# Patient Record
Sex: FEMALE | Race: WHITE | NOT HISPANIC OR LATINO | URBAN - METROPOLITAN AREA
[De-identification: names, ages, dates, MRNs, and addresses within clinical notes are randomized per-mention and may not be internally consistent; named-entity substitution may affect disease eponyms.]

---

## 2022-11-23 ENCOUNTER — OFFICE VISIT (OUTPATIENT)
Dept: URBAN - METROPOLITAN AREA CLINIC 28 | Facility: CLINIC | Age: 52
End: 2022-11-23
Payer: COMMERCIAL

## 2022-11-23 DIAGNOSIS — H11.442 CONJUNCTIVAL CYSTS, LEFT EYE: Primary | ICD-10-CM

## 2022-11-23 DIAGNOSIS — H11.012 AMYLOID PTERYGIUM OF LEFT EYE: ICD-10-CM

## 2022-11-23 PROCEDURE — 99203 OFFICE O/P NEW LOW 30 MIN: CPT | Performed by: OPTOMETRIST

## 2022-11-23 NOTE — IMPRESSION/PLAN
Impression: Amyloid pterygium of left eye: H11.012. Plan: Discussed diagnosis in detail with patient. Discussed treatment options with patient. Patient instructed to use artificial tears and sun protection. Consult recommended [Cornea Specialist] for surgical evaluation.

## 2022-11-23 NOTE — IMPRESSION/PLAN
Impression: Conjunctival cysts, left eye: H11.442. Plan: Discussed diagnosis in detail with patient. Discussed treatment options with patient. Patient instructed to use artificial tears and sun protection. Consult recommended [Cornea Specialist] for surgical evaluation.

## 2022-12-09 ENCOUNTER — OFFICE VISIT (OUTPATIENT)
Dept: URBAN - METROPOLITAN AREA CLINIC 33 | Facility: CLINIC | Age: 52
End: 2022-12-09
Payer: COMMERCIAL

## 2022-12-09 DIAGNOSIS — H11.012 AMYLOID PTERYGIUM OF LEFT EYE: Primary | ICD-10-CM

## 2022-12-09 PROCEDURE — 99203 OFFICE O/P NEW LOW 30 MIN: CPT | Performed by: OPHTHALMOLOGY

## 2022-12-09 RX ORDER — OFLOXACIN 3 MG/ML
0.3 % SOLUTION/ DROPS OPHTHALMIC
Qty: 5 | Refills: 1 | Status: ACTIVE
Start: 2022-12-09

## 2022-12-09 RX ORDER — PREDNISOLONE ACETATE 10 MG/ML
1 % SUSPENSION/ DROPS OPHTHALMIC
Qty: 10 | Refills: 1 | Status: ACTIVE
Start: 2022-12-09

## 2022-12-09 ASSESSMENT — INTRAOCULAR PRESSURE
OS: 13
OD: 13

## 2023-02-06 ENCOUNTER — POST-OPERATIVE VISIT (OUTPATIENT)
Dept: URBAN - METROPOLITAN AREA CLINIC 23 | Facility: CLINIC | Age: 53
End: 2023-02-06
Payer: COMMERCIAL

## 2023-02-06 DIAGNOSIS — Z48.810 ENCOUNTER FOR SURGICAL AFTERCARE FOLLOWING SURGERY ON A SENSE ORGAN: Primary | ICD-10-CM

## 2023-02-06 PROCEDURE — 99024 POSTOP FOLLOW-UP VISIT: CPT | Performed by: OPTOMETRIST

## 2023-02-06 ASSESSMENT — KERATOMETRY
OS: 44.75
OD: 44.75

## 2023-02-06 NOTE — IMPRESSION/PLAN
Impression: S/P Pterygium Excision with Graft OS - 17 Days. Encounter for surgical aftercare following surgery on a sense organ  Z48.810. Excellent post op course   Post operative instructions reviewed - Plan: Pt edu. Appropriate appearance and IOP. Use AT's PRN OU for comfort. Advised patient to call with any issues. RTC 3-4 weeks. Taper Pred as directed. --Continue Prednisolone acetate 1% BID OU--Advised patient to use artificial tears for comfort.

## 2023-03-03 ENCOUNTER — POST-OPERATIVE VISIT (OUTPATIENT)
Dept: URBAN - METROPOLITAN AREA CLINIC 23 | Facility: CLINIC | Age: 53
End: 2023-03-03
Payer: COMMERCIAL

## 2023-03-03 DIAGNOSIS — Z48.810 ENCOUNTER FOR SURGICAL AFTERCARE FOLLOWING SURGERY ON A SENSE ORGAN: Primary | ICD-10-CM

## 2023-03-03 PROCEDURE — 99024 POSTOP FOLLOW-UP VISIT: CPT | Performed by: OPTOMETRIST

## 2023-03-03 ASSESSMENT — INTRAOCULAR PRESSURE
OS: 13
OD: 13

## 2023-03-03 NOTE — IMPRESSION/PLAN
Impression: S/P Pterygium Excision with Graft OS - 42 Days. Encounter for surgical aftercare following surgery on a sense organ  Z48.810. Excellent post op course   Post operative instructions reviewed - Plan: Pt edu. Appropriate appearance and IOP. Use AT's PRN OU for comfort. Advised patient to call with any issues. RTC as scheduled. --Advised patient to use artificial tears for comfort.

## 2023-04-11 ENCOUNTER — OFFICE VISIT (OUTPATIENT)
Dept: URBAN - METROPOLITAN AREA CLINIC 22 | Facility: CLINIC | Age: 53
End: 2023-04-11
Payer: COMMERCIAL

## 2023-04-11 DIAGNOSIS — H52.4 PRESBYOPIA: Primary | ICD-10-CM

## 2023-04-11 PROCEDURE — 92014 COMPRE OPH EXAM EST PT 1/>: CPT | Performed by: STUDENT IN AN ORGANIZED HEALTH CARE EDUCATION/TRAINING PROGRAM

## 2023-04-11 ASSESSMENT — INTRAOCULAR PRESSURE
OD: 18
OS: 17

## 2023-04-11 ASSESSMENT — VISUAL ACUITY
OS: 20/20
OD: 20/20